# Patient Record
Sex: MALE | Race: WHITE | Employment: OTHER | URBAN - METROPOLITAN AREA
[De-identification: names, ages, dates, MRNs, and addresses within clinical notes are randomized per-mention and may not be internally consistent; named-entity substitution may affect disease eponyms.]

---

## 2021-05-25 ENCOUNTER — OFFICE VISIT (OUTPATIENT)
Dept: FAMILY MEDICINE | Facility: OTHER | Age: 71
End: 2021-05-25
Payer: MEDICARE

## 2021-05-25 ENCOUNTER — ALLIED HEALTH/NURSE VISIT (OUTPATIENT)
Dept: FAMILY MEDICINE | Facility: OTHER | Age: 71
End: 2021-05-25
Payer: MEDICARE

## 2021-05-25 VITALS
OXYGEN SATURATION: 96 % | HEART RATE: 85 BPM | SYSTOLIC BLOOD PRESSURE: 143 MMHG | DIASTOLIC BLOOD PRESSURE: 87 MMHG | TEMPERATURE: 98 F

## 2021-05-25 VITALS
HEART RATE: 85 BPM | HEIGHT: 72 IN | DIASTOLIC BLOOD PRESSURE: 87 MMHG | OXYGEN SATURATION: 96 % | WEIGHT: 180 LBS | TEMPERATURE: 98 F | RESPIRATION RATE: 16 BRPM | BODY MASS INDEX: 24.38 KG/M2 | SYSTOLIC BLOOD PRESSURE: 143 MMHG

## 2021-05-25 DIAGNOSIS — H01.005 BLEPHARITIS OF LEFT LOWER EYELID, UNSPECIFIED TYPE: Primary | ICD-10-CM

## 2021-05-25 DIAGNOSIS — Z78.9 TRIAGE ASSESSMENT CLASS 3, NONURGENT: Primary | ICD-10-CM

## 2021-05-25 PROBLEM — F33.1 MDD (MAJOR DEPRESSIVE DISORDER), RECURRENT EPISODE, MODERATE (H): Status: ACTIVE | Noted: 2021-01-23

## 2021-05-25 PROCEDURE — 99213 OFFICE O/P EST LOW 20 MIN: CPT | Performed by: FAMILY MEDICINE

## 2021-05-25 PROCEDURE — 99207 PR NO CHARGE NURSE ONLY: CPT

## 2021-05-25 RX ORDER — SUMATRIPTAN 100 MG/1
TABLET, FILM COATED ORAL
COMMUNITY
Start: 2021-03-14

## 2021-05-25 RX ORDER — NALOXONE HYDROCHLORIDE 4 MG/.1ML
SPRAY NASAL
COMMUNITY
Start: 2020-11-17

## 2021-05-25 RX ORDER — LORAZEPAM 1 MG/1
TABLET ORAL
COMMUNITY
Start: 2021-04-28

## 2021-05-25 RX ORDER — ARIPIPRAZOLE 20 MG/1
20 TABLET ORAL DAILY
COMMUNITY
Start: 2021-05-05

## 2021-05-25 RX ORDER — GABAPENTIN 300 MG/1
300 CAPSULE ORAL 4 TIMES DAILY
COMMUNITY
Start: 2021-05-07

## 2021-05-25 RX ORDER — VENLAFAXINE HYDROCHLORIDE 75 MG/1
CAPSULE, EXTENDED RELEASE ORAL
COMMUNITY
Start: 2021-05-11

## 2021-05-25 RX ORDER — SIMVASTATIN 80 MG
40 TABLET ORAL DAILY
COMMUNITY
Start: 2021-03-19

## 2021-05-25 RX ORDER — ERYTHROMYCIN 5 MG/G
0.5 OINTMENT OPHTHALMIC 3 TIMES DAILY PRN
Qty: 1 G | Refills: 0 | Status: SHIPPED | OUTPATIENT
Start: 2021-05-25

## 2021-05-25 RX ORDER — LIDOCAINE 50 MG/G
PATCH TOPICAL
COMMUNITY
Start: 2021-03-10

## 2021-05-25 ASSESSMENT — MIFFLIN-ST. JEOR: SCORE: 1618.98

## 2021-05-25 NOTE — PROGRESS NOTES
Patient came in with left eyelid swollen since Sunday.    He states the pain is a 4 out of 10 in his eye lid.  He has no vision loss.    There is some pain on movement of his eye.    He has no difficulty breathing.    Evaluation of eye lid : lower eye lid is red and swollen.    Huddled with Dr. Sheppard to see if she can see him today.    She agrees to see him at 1:20 pm.    Patient notified and walked back out to the waiting room.    Lisette Olivarez RN on 5/25/2021 at 12:37 PM

## 2021-05-25 NOTE — PROGRESS NOTES
"    Assessment & Plan     Blepharitis of left lower eyelid, unspecified type  Recommend warm compresses.  Will treat with topical erythromycin ointment.  If symptoms worsen we will then have him see his eye doctor.    - erythromycin (ROMYCIN) 5 MG/GM ophthalmic ointment; Place 0.5 inches Into the left eye 3 times daily as needed    Sonja Sheppard MD  [facility] LUZMARIA Horne is a 70 year old who presents for the following health issues     HPI     Patient came in with left eyelid swollen since Sunday.     He states the pain is a 4 out of 10 in his eye lid.  He has no vision loss.     There is some pain on movement of his eye.     He has no difficulty breathing.     Evaluation of eye lid : lower eye lid is red and swollen.     Huddled with Dr. Sheppard to see if she can see him today.     She agrees to see him at 1:20 pm.     Patient notified and walked back out to the waiting room.    Lisette Olivarez RN on 5/25/2021 at 12:37 PM    Patient states that discomfort is in his lower eyelid and not in the eye.  He denies any visual changes.  Denies diplopia.  Denies blurry vision.  Denies fever.        Objective    BP (!) 143/87   Pulse 85   Temp 98  F (36.7  C) (Oral)   Resp 16   Ht 1.82 m (5' 11.65\")   Wt 81.6 kg (180 lb)   SpO2 96%   BMI 24.65 kg/m    Body mass index is 24.65 kg/m .  Physical Exam   Gen: no apparent distress  Eyes: Left eye has some swelling and erythema of the lower eyelid.  He also has some crusting along the eyelashes.  Inside the eyelid there appears to be a developing stye.  Sclera is clear.  Extraocular movements intact.  No pain to palpation.          "

## 2022-01-18 ENCOUNTER — VIRTUAL VISIT (OUTPATIENT)
Dept: FAMILY MEDICINE | Facility: CLINIC | Age: 72
End: 2022-01-18
Payer: MEDICARE

## 2022-01-18 ENCOUNTER — NURSE TRIAGE (OUTPATIENT)
Dept: NURSING | Facility: CLINIC | Age: 72
End: 2022-01-18
Payer: COMMERCIAL

## 2022-01-18 ENCOUNTER — NURSE TRIAGE (OUTPATIENT)
Dept: NURSING | Facility: CLINIC | Age: 72
End: 2022-01-18

## 2022-01-18 DIAGNOSIS — R06.02 SHORTNESS OF BREATH: ICD-10-CM

## 2022-01-18 DIAGNOSIS — R05.9 COUGH: ICD-10-CM

## 2022-01-18 DIAGNOSIS — Z20.822 SUSPECTED 2019 NOVEL CORONAVIRUS INFECTION: Primary | ICD-10-CM

## 2022-01-18 PROCEDURE — 99213 OFFICE O/P EST LOW 20 MIN: CPT | Mod: 95 | Performed by: NURSE PRACTITIONER

## 2022-01-18 NOTE — PROGRESS NOTES
Ozzie is a 71 year old who is being evaluated via a billable video visit.      How would you like to obtain your AVS? Mail a copy  If the video visit is dropped, the invitation should be resent by: Text to cell phone: [phone]  Will anyone else be joining your video visit? No      Video Start Time: 12:35 PM    Assessment & Plan     Suspected 2019 novel coronavirus infection  Symptoms including headache, cough, mild shortness of breath, body aches and diarrhea x6 days.  Wife also sick with similar symptoms with testing in progress.  Patient's cough seems to be somewhat better today, has been afebrile.  Is maintaining fluid intake.  Recommend testing for both COVID and influenza given symptoms.  Patient agreeable.  Will be receiving a phone call from schedulers to schedule and we will be in touch with results and any further recommendations.  Recommend continue to stay quarantined until results are back.  If symptoms are worsening advised to be seen in the clinic.  - Symptomatic; Yes; 1/13/2022 COVID-19 Virus (Coronavirus) by PCR; Future    Cough  Cough accompanied by symptoms as above.  - Influenza A/B antigen; Future    Shortness of breath  Shortness of breath accompanied by symptoms as above.  - Influenza A/B antigen; Future             See Patient Instructions    Return in about 1 week (around 1/25/2022), or if symptoms worsen or fail to improve.    Sanaz Joseph DNP, APRN-CNP   [facility]    Subjective   Ozzie is a 71 year old who presents for the following health issues:    HPI       Concern for COVID-19  About how many days ago did these symptoms start? 6 days  Is this your first visit for this illness? Yes  In the 14 days before your symptoms started, have you had close contact with someone with COVID-19 (Coronavirus)? Yes, I have been in contact with someone who has symptoms of COVID-19/Coronavirus (no lab test done or waiting on results).  Do you have a fever or chills?  No - doesn't feel like he is   Are you having new or worsening difficulty breathing? Yes  Please describe what kind of difficulty you are having breathing:Mild dyspnea (able to do ADLs without difficulty, mild shortness of breath with activities such as climbing one or two flights of stairs or walking briskly)  Do you have new or worsening cough? Yes, it's a dry cough.   Have you had any new or unexplained body aches? YES    Have you experienced any of the following NEW symptoms?    Headache: YES    Sore throat: No    Loss of taste or smell: No    Chest pain: YES and mild - achy    Diarrhea: YES    Rash: No  What treatments have you tried? Ibuprofen  Who do you live with? Wife  Are you, or a household member, a healthcare worker or a ? YES wife  Do you live in a nursing home, group home, or shelter? No  Do you have a way to get food/medications if quarantined? Yes, I have a friend or family member who can help me.    Wife also sick, same symptoms              Review of Systems   Constitutional, HEENT, cardiovascular, pulmonary, gi and gu systems are negative, except as otherwise noted.      Objective           Vitals:  No vitals were obtained today due to virtual visit.    Physical Exam   GENERAL: Healthy, alert and no distress  EYES: Eyes grossly normal to inspection.  No discharge or erythema, or obvious scleral/conjunctival abnormalities.  RESP: No audible wheeze, cough, or visible cyanosis.  No visible retractions or increased work of breathing.    SKIN: Visible skin clear. No significant rash, abnormal pigmentation or lesions.  NEURO: Cranial nerves grossly intact.  Mentation and speech appropriate for age.  PSYCH: Mentation appears normal, affect normal/bright, judgement and insight intact, normal speech and appearance well-groomed.    Diagnostic Test Results:  Pending            Video-Visit Details    Type of service:  Video Visit    Video End Time:12:47 PM    Originating Location (pt.  Location): Home    Distant Location (provider location):  [facility]     Platform used for Video Visit: [employer]     Chart documentation with Dragon Voice recognition Software. Although reviewed after completion, some words and grammatical errors may remain.

## 2022-01-18 NOTE — TELEPHONE ENCOUNTER
Cough, light headed and sob. Day 6 .    Brain injury cannot drive.    COVID 19 Nurse Triage Plan/Patient Instructions    Please be aware that novel coronavirus (COVID-19) may be circulating in the community. If you develop symptoms such as fever, cough, or SOB or if you have concerns about the presence of another infection including coronavirus (COVID-19), please contact your health care provider or visit [email or web address].     Disposition/Instructions    Virtual Visit with provider recommended. Reference Visit Selection Guide.    Thank you for taking steps to prevent the spread of this virus.  o Limit your contact with others.  o Wear a simple mask to cover your cough.  o Wash your hands well and often.    Resources    [facility]: About COVID-19: [email or web address]    CDC: What to Do If You're Sick: www.cdc.gov/coronavirus/2019-ncov/about/steps-when-sick.html    CDC: Ending Home Isolation: www.cdc.gov/coronavirus/2019-ncov/hcp/disposition-in-home-patients.html     CDC: Caring for Someone: www.cdc.gov/coronavirus/2019-ncov/if-you-are-sick/care-for-someone.html     [facility]: Interim Guidance for Hospital Discharge to Home: www.health.[facility].mn.us/diseases/coronavirus/hcp/hospdischarge.pdf    [facility] clinical trials (COVID-19 research studies): clinicalaffairs.[facility]-clinical-trials     Below are the COVID-19 hotlines at the [facility] of [facility] ([facility]). Interpreters are available.   o For health questions: Call 870-526-5042 or 1-888.135.7516 (7 a.m. to 7 p.m.)  o For questions about schools and childcare: Call 612-352-5923 or 1-950.798.5230 (7 a.m. to 7 p.m.)                   Reason for Disposition    HIGH RISK for severe COVID complications (e.g., age > 64 years, obesity with BMI > 25, pregnant, chronic lung disease or other chronic medical condition)  (Exception: Already seen by PCP and no new or worsening symptoms.)    Additional Information    Negative:  SEVERE difficulty breathing (e.g., struggling for each breath, speaks in single words)    Negative: Difficult to awaken or acting confused (e.g., disoriented, slurred speech)    Negative: Bluish (or gray) lips or face now    Negative: Shock suspected (e.g., cold/pale/clammy skin, too weak to stand, low BP, rapid pulse)    Negative: Sounds like a life-threatening emergency to the triager    Negative: SEVERE or constant chest pain or pressure (Exception: mild central chest pain, present only when coughing)    Negative: MODERATE difficulty breathing (e.g., speaks in phrases, SOB even at rest, pulse 100-120)    Negative: [1] Headache AND [2] stiff neck (can't touch chin to chest)    Negative: [1] COVID-19 exposure AND [2] no symptoms    Negative: COVID-19 vaccine reaction suspected (e.g., fever, headache, muscle aches) occurring 1 to 3 days after getting vaccine    Negative: COVID-19 vaccine, questions about    Negative: [1] Lives with someone known to have influenza (flu test positive) AND [2] flu-like symptoms (e.g., cough, runny nose, sore throat, SOB; with or without fever)    Negative: [1] Adult with possible COVID-19 symptoms AND [2] triager concerned about severity of symptoms or other causes    Negative: COVID-19 and breastfeeding, questions about    Negative: MILD difficulty breathing (e.g., minimal/no SOB at rest, SOB with walking, pulse <100)    Negative: Chest pain or pressure    Negative: Patient sounds very sick or weak to the triager    Negative: Fever > 103 F (39.4 C)    Negative: [1] Fever > 101 F (38.3 C) AND [2] age > 60 years    Negative: [1] Fever > 100.0 F (37.8 C) AND [2] bedridden (e.g., nursing home patient, CVA, chronic illness, recovering from surgery)    Protocols used: CORONAVIRUS (COVID-19) DIAGNOSED OR [id number] 8.25.2021

## 2022-01-18 NOTE — TELEPHONE ENCOUNTER
Patient had a video appointment with Dr. Joseph today who advised patient get a Covid test. Patient tried to schedule one, but could not get in until Friday.    Advised patient to try looking on the [facility] Website for further testing options or [facility] or [facility].    Patient states he has been having symptoms since Thursday. Declined triage since he just had a virtual visit.    Patient verbalized understanding and had no further questions.    Porsha Ashley RN  01/18/22 4:04 PM  [facility] Nurse Advisor    Additional Information    Information only question and nurse able to answer    Protocols used: INFORMATION ONLY CALL - NO TRIAGE-A-OH

## 2022-01-18 NOTE — PATIENT INSTRUCTIONS
Suspected 2019 novel coronavirus infection  Symptoms including headache, cough, mild shortness of breath, body aches and diarrhea x6 days.  Wife also sick with similar symptoms with testing in progress.  Patient's cough seems to be somewhat better today, has been afebrile.  Is maintaining fluid intake.  Recommend testing for both COVID and influenza given symptoms.  Patient agreeable.  Will be receiving a phone call from schedulers to schedule and we will be in touch with results and any further recommendations.  Recommend continue to stay quarantined until results are back.  If symptoms are worsening advised to be seen in the clinic.  - Symptomatic; Yes; 1/13/2022 COVID-19 Virus (Coronavirus) by PCR; Future    Cough  Cough accompanied by symptoms as above.  - Influenza A/B antigen; Future    Shortness of breath  Shortness of breath accompanied by symptoms as above.  - Influenza A/B antigen; Future    Discharge Instructions for COVID-19 Patients  You have--or may have--COVID-19. Please follow the instructions listed below.   If you have a weakened immune system, discuss with your doctor any other actions you need to take.  How can I protect others?  If you have symptoms (fever, cough, body aches or trouble breathing):    Stay home and away from others (self-isolate) until:  ? Your other symptoms have resolved (gotten better). And   ? You've had no fever--and no medicine that reduces fever--for 1 full day (24 hours). And   ? At least 5 days have passed since your symptoms started. (You may need to wait 10 days. Follow the advice of your care team.)  If you don't show symptoms, but testing showed that you have COVID-19:    Stay home and away from others (self-isolate) until at least 10 days have passed since the date of your first positive COVID-19 test.  During this time    Stay in your own room, even for meals. Use your own bathroom if you can.    Stay away from others in your home. No hugging, kissing or shaking  "hands. No visitors.    Don't go to work, school or anywhere else.    Clean \"high touch\" surfaces often (doorknobs, counters, handles). Use household cleaning spray or wipes.    You'll find a full list of  on the EPA website: www.epa.gov/pesticide-registration/list-n-disinfectants-use-against-sars-cov-2.    Cover your mouth and nose with a mask or other face covering to avoid spreading germs.    Wash your hands and face often. Use soap and water.    Caregivers in these groups are at risk for severe illness due to COVID-19:  ? People 65 years and older  ? People who live in a nursing home or long-term care facility  ? People with chronic disease (lung, heart, cancer, diabetes, kidney, liver, immunologic)  ? People who have a weakened immune system, including those who:    Are in cancer treatment    Take medicine that weakens the immune system, such as corticosteroids    Had a bone marrow or organ transplant    Have an immune deficiency    Have poorly controlled HIV or AIDS    Are obese (body mass index of 40 or higher)    Smoke regularly    Caregivers should wear gloves while washing dishes, handling laundry and cleaning bedrooms and bathrooms.    Use caution when washing and drying laundry: Don't shake dirty laundry and use the warmest water setting that you can.    For more tips on managing your health at home, go to www.cdc.gov/coronavirus/2019-ncov/downloads/10Things.pdf.  How can I take care of myself at home?  1. Get lots of rest. Drink extra fluids (unless a doctor has told you not to).  2. Take Tylenol (acetaminophen) for fever or pain. If you have liver or kidney problems, ask your family doctor if it's okay to take Tylenol.   Adults can take either:   ? 650 mg (two 325 mg pills) every 4 to 6 hours, or   ? 1,000 mg (two 500 mg pills) every 8 hours as needed.  ? Note: Don't take more than 3,000 mg in one day. Acetaminophen is found in many medicines (both prescribed and over-the-counter medicines). " Read all labels to be sure you don't take too much.   For children, check the Tylenol bottle for the right dose. The dose is based on the child's age or weight.  3. If you have other health problems (like cancer, heart failure, an organ transplant or severe kidney disease): Call your specialty clinic if you don't feel better in the next 2 days.  4. Know when to call 911. Emergency warning signs include:  ? Trouble breathing or shortness of breath  ? Pain or pressure in the chest that doesn't go away  ? Feeling confused like you haven't felt before, or not being able to wake up  ? Bluish-colored lips or face  5. Your doctor may have prescribed a blood thinner medicine. Follow their instructions.  Where can I get more information?    [facility] - About COVID-19:   [email or web address]    CDC - What to Do If You're Sick: www.cdc.gov/coronavirus/2019-ncov/about/steps-when-sick.html    CDC - Ending Home Isolation: www.cdc.gov/coronavirus/2019-ncov/hcp/disposition-in-home-patients.html    CDC - Caring for Someone: www.cdc.gov/coronavirus/2019-ncov/if-you-are-sick/care-for-someone.html    [facility] - Interim Guidance for Hospital Discharge to Home: www.health.[facility].mn.us/diseases/coronavirus/hcp/hospdischarge.pdf    Below are the COVID-19 hotlines at the [facility] ([facility]). Interpreters are available.  ? For health questions: Call 678-415-7165 or 1-134.539.8895 (7 a.m. to 7 p.m.)  ? For questions about schools and childcare: Call 272-569-0505 or 1-252.408.8427 (7 a.m. to 7 p.m.)    For informational purposes only. Not to replace the advice of your health care provider. Clinically reviewed by Dr. Malcolm Barkley.   Copyright   2020 [employer]. All rights reserved. [employer] [id number] - REV 01/05/21.

## 2022-01-20 ENCOUNTER — VIRTUAL VISIT (OUTPATIENT)
Dept: FAMILY MEDICINE | Facility: CLINIC | Age: 72
End: 2022-01-20
Payer: MEDICARE

## 2022-01-20 ENCOUNTER — TELEPHONE (OUTPATIENT)
Dept: FAMILY MEDICINE | Facility: CLINIC | Age: 72
End: 2022-01-20
Payer: COMMERCIAL

## 2022-01-20 DIAGNOSIS — U07.1 INFECTION DUE TO 2019 NOVEL CORONAVIRUS: Primary | ICD-10-CM

## 2022-01-20 PROBLEM — F12.280: Status: ACTIVE | Noted: 2022-01-20

## 2022-01-20 PROCEDURE — 99442 PR PHYSICIAN TELEPHONE EVALUATION 11-20 MIN: CPT | Mod: 95 | Performed by: PHYSICIAN ASSISTANT

## 2022-01-20 NOTE — PROGRESS NOTES
"Ozzie is a 71 year old who is being evaluated via a billable telephone visit.      What phone number would you like to be contacted at? [phone]  How would you like to obtain your AVS? Mail a copy    Assessment & Plan     Infection due to 2019 novel coronavirus  Pt has sx compatible with covid.   Has not been able to get a test  His wife is COVID positive.   This is his 2nd virtual appointment for his sx.  He is anxious about his breathing sx.   They did have EMS out today with normal VS and normal sats, but then  declined to go in to the hospital at that time.   He reports emphysema but I do not see this [facility] which would probably have benefit from prednisone.  However, over the phone he is now reporting chest pain and feeling weak and difficulty with getting around his home  Advised needing in person assessment. They state agreement       No follow-ups on file.    MCKAYLA Song [facility]    Miranda Horne is a 71 year old who presents for the following health issues   HPI     Discuss medications for possible COVID  Gets tested tomorrow  Wife is positive for COVID and \"her and I have the same symptoms\"  States he \"needs some kind of relief\"  ---  Wife has covid- Kedar has same sx.   His sx started 7-8 days ago  I am feeling pretty rough. I feel run down and tired.   Headache, coughing.   Breathing is \"real short\"   Reporting chest pains he cannot describe.    Wife was concerned - she called 911 because he was very short of breath \"I wanted to see his oxygen sats\". Paramedics came- they checked him out. Normal vital signs. They gave choice and they opted not to get brought in.   BP-  130/70  Pulse- 88   Oxygen sats- 96%     He did virtual visit 2 days ago-   About same.   Has chest pains now. He is vague about them and cannot describe where or frequency.   So fatigued and SOB. Has stairs in home- can walk up them but hard to catch breath. "   Diarrhea is better.     Has an inhaler- albuterol. Wife states that he has emphysema. Used to smoke- quit 5 yr ago.   Feels wheezy. Cough is dry.      [facility]     [facility]. She got prednisone and wants yann to have prednisone.     Drinking water- urinating normally.   Low appetite. Bowl cereal, rib eye steak, fruit.       Review of Systems   Constitutional, HEENT, cardiovascular, pulmonary, gi and gu systems are negative, except as otherwise noted.      Objective           Vitals:  No vitals were obtained today due to virtual visit.    Physical Exam   healthy, alert and no distress  PSYCH: Alert and oriented times 3; coherent speech, normal   rate and volume, able to articulate logical thoughts, able   to abstract reason, no tangential thoughts, no hallucinations   or delusions  His affect is normal and pleasant  RESP: No cough, no audible wheezing, able to talk in full sentences  Remainder of exam unable to be completed due to telephone visits          Phone call duration: 13 minutes

## 2022-01-20 NOTE — TELEPHONE ENCOUNTER
Reason for Call:      Detailed comments: Fatigue, SOB, coughing  VV 1/18/21 PJ Joseph  Pt has appt 1/21/22 for Covid Test   Pt did just have ambulance out to see him and checked him over but did not take pt to the Hospital  Please Advise next step as pt is not feeling good.    Phone Number Patient can be reached at: Home number on file [phone] (home)    Best Time: Any Time      Can we leave a detailed message on this number? YES    Call taken on 1/20/2022 at 9:54 AM by Nara Preston

## 2022-01-20 NOTE — TELEPHONE ENCOUNTER
S-(situation): call placed to patient. Permission over phone to speak with wife Rose Mary.    B-(background): continues with covid symptoms. Reports not worse but more anxious. Did call ambulance for evaluation and not recommended to go to hospital. She is asking for prednisone.    A-(assessment): No increased symptoms since 1/18/22.     R-(recommendations): Reviewed home care for symptoms with covid. Offered virtual appointment and urgent care visit. Declined. Wife states she will give him her prednisone.

## 2024-07-30 ENCOUNTER — TRANSCRIBE ORDERS (OUTPATIENT)
Dept: OTHER | Age: 74
End: 2024-07-30

## 2024-07-30 DIAGNOSIS — J44.9 CHRONIC OBSTRUCTIVE PULMONARY DISEASE, UNSPECIFIED (H): Primary | ICD-10-CM

## 2024-10-02 ENCOUNTER — HOSPITAL ENCOUNTER (OUTPATIENT)
Dept: CARDIAC REHAB | Facility: CLINIC | Age: 74
Discharge: HOME | End: 2024-10-02
Attending: INTERNAL MEDICINE
Payer: COMMERCIAL

## 2024-10-02 DIAGNOSIS — J44.9 CHRONIC OBSTRUCTIVE PULMONARY DISEASE, UNSPECIFIED (H): ICD-10-CM

## 2024-10-02 PROCEDURE — 999N000104 HC STATISTIC NO CHARGE

## 2024-10-02 NOTE — PROGRESS NOTES
Pt decided to to participate in CT at this time. He focus on walking 3x a week with his wife and if unable to do he will contact staff to reschedule.